# Patient Record
Sex: FEMALE | Race: OTHER | Employment: FULL TIME | URBAN - METROPOLITAN AREA
[De-identification: names, ages, dates, MRNs, and addresses within clinical notes are randomized per-mention and may not be internally consistent; named-entity substitution may affect disease eponyms.]

---

## 2017-05-08 ENCOUNTER — HOSPITAL ENCOUNTER (EMERGENCY)
Facility: HOSPITAL | Age: 22
Discharge: HOME | End: 2017-05-08
Payer: COMMERCIAL

## 2017-05-08 ENCOUNTER — APPOINTMENT (OUTPATIENT)
Dept: GENERAL RADIOLOGY | Facility: HOSPITAL | Age: 22
End: 2017-05-08
Attending: NURSE PRACTITIONER
Payer: COMMERCIAL

## 2017-05-08 ENCOUNTER — HOSPITAL ENCOUNTER (OUTPATIENT)
Age: 22
Discharge: ANOTHER HOSPITAL | End: 2017-05-08
Attending: EMERGENCY MEDICINE
Payer: COMMERCIAL

## 2017-05-08 ENCOUNTER — APPOINTMENT (OUTPATIENT)
Dept: CT IMAGING | Facility: HOSPITAL | Age: 22
End: 2017-05-08
Attending: NURSE PRACTITIONER
Payer: COMMERCIAL

## 2017-05-08 ENCOUNTER — TELEPHONE (OUTPATIENT)
Dept: FAMILY MEDICINE CLINIC | Facility: CLINIC | Age: 22
End: 2017-05-08

## 2017-05-08 VITALS
TEMPERATURE: 99 F | RESPIRATION RATE: 15 BRPM | DIASTOLIC BLOOD PRESSURE: 55 MMHG | SYSTOLIC BLOOD PRESSURE: 108 MMHG | WEIGHT: 120 LBS | HEIGHT: 61 IN | BODY MASS INDEX: 22.66 KG/M2 | HEART RATE: 75 BPM | OXYGEN SATURATION: 99 %

## 2017-05-08 VITALS
TEMPERATURE: 98 F | WEIGHT: 120 LBS | RESPIRATION RATE: 16 BRPM | DIASTOLIC BLOOD PRESSURE: 49 MMHG | BODY MASS INDEX: 22.66 KG/M2 | SYSTOLIC BLOOD PRESSURE: 96 MMHG | HEART RATE: 72 BPM | HEIGHT: 61 IN | OXYGEN SATURATION: 100 %

## 2017-05-08 DIAGNOSIS — I95.9 HYPOTENSION, UNSPECIFIED HYPOTENSION TYPE: ICD-10-CM

## 2017-05-08 DIAGNOSIS — R55 SYNCOPE, NEAR: Primary | ICD-10-CM

## 2017-05-08 PROCEDURE — 81001 URINALYSIS AUTO W/SCOPE: CPT

## 2017-05-08 PROCEDURE — 81025 URINE PREGNANCY TEST: CPT

## 2017-05-08 PROCEDURE — 36415 COLL VENOUS BLD VENIPUNCTURE: CPT

## 2017-05-08 PROCEDURE — 93010 ELECTROCARDIOGRAM REPORT: CPT

## 2017-05-08 PROCEDURE — 93005 ELECTROCARDIOGRAM TRACING: CPT

## 2017-05-08 PROCEDURE — 93010 ELECTROCARDIOGRAM REPORT: CPT | Performed by: INTERNAL MEDICINE

## 2017-05-08 PROCEDURE — 70450 CT HEAD/BRAIN W/O DYE: CPT | Performed by: NURSE PRACTITIONER

## 2017-05-08 PROCEDURE — 71020 XR CHEST PA + LAT CHEST (CPT=71020): CPT | Performed by: NURSE PRACTITIONER

## 2017-05-08 PROCEDURE — 84484 ASSAY OF TROPONIN QUANT: CPT | Performed by: NURSE PRACTITIONER

## 2017-05-08 PROCEDURE — 93010 ELECTROCARDIOGRAM REPORT: CPT | Performed by: EMERGENCY MEDICINE

## 2017-05-08 PROCEDURE — 85025 COMPLETE CBC W/AUTO DIFF WBC: CPT

## 2017-05-08 PROCEDURE — 99285 EMERGENCY DEPT VISIT HI MDM: CPT

## 2017-05-08 PROCEDURE — 99215 OFFICE O/P EST HI 40 MIN: CPT

## 2017-05-08 PROCEDURE — 80048 BASIC METABOLIC PNL TOTAL CA: CPT

## 2017-05-08 NOTE — ED PROVIDER NOTES
Patient Seen in: [facility] [place] [facility] Immediate Care In [address] [place] [address]    History   Patient presents with:  Syncope (cardiovascular, neurologic)    Stated Complaint: syncope 3 times    HPI    The patient is a 25-year-old female with past history of syncope in the in HPI.     Physical Exam       ED Triage Vitals   BP 05/08/17 1139 81/48 mmHg   Pulse 05/08/17 1139 82   Resp 05/08/17 1139 16   Temp 05/08/17 1139 97.9 °F (36.6 °C)   Temp src 05/08/17 1139 Oral   SpO2 05/08/17 1139 100 %   O2 Device 05/08/17 1139 None (R diagnosis)  Hypotension, unspecified hypotension type    Disposition: Ic to ed    Follow-up:  No follow-up provider specified. Medications Prescribed:  There are no discharge medications for this patient.

## 2017-05-08 NOTE — ED INITIAL ASSESSMENT (HPI)
Patient c/o 3 near syncopal episodes this morning. Hx syncope. Sent from [address] [place] [address].

## 2017-05-08 NOTE — ED PROVIDER NOTES
Patient Seen in: [facility] Emergency Department    History   Patient presents with:  Syncope (cardiovascular, neurologic)    Stated Complaint: syncopal episode sent by car from [facility]5 Yadira WANG    20 y/o female presents to the ED From the [facility] other systems reviewed and negative except as noted above. PSFH elements reviewed from today and agreed except as otherwise stated in HPI.     Physical Exam       ED Triage Vitals   BP 05/08/17 1251 108/57 mmHg   Pulse 05/08/17 1251 80   Resp 05/08/17 12 of motion. Neurological: She is alert and oriented to person, place, and time. She has normal strength. No cranial nerve deficit or sensory deficit. GCS eye subscore is 4. GCS verbal subscore is 5. GCS motor subscore is 6. PERRL, accomodation intact.  Rosiland Fallow Miltonewa Collins DO  [address]          [place] [id number] [phone]    Call in 2 days      Colby Storey MD  [address] [facility] [phone]

## 2017-05-08 NOTE — TELEPHONE ENCOUNTER
Definitely should go from the immediate care to the emergency room as they advised and get this worked up.

## 2017-05-08 NOTE — ED INITIAL ASSESSMENT (HPI)
Syncopal episodes x 3 this am    No Loc  Hx of syncope , last episode last October  EKG normal   Did follow up with a CT scan

## 2017-05-08 NOTE — TELEPHONE ENCOUNTER
Pt calling states she suffers form syncope, pt states she had 3 episodes today. Pt would like appt but non available.

## 2017-05-08 NOTE — TELEPHONE ENCOUNTER
Actions Requested: Requesting appt with Víctor, referred to the ED  Situation/Background   Problem: Syncopal episodes x 3 today.    Onset: Today   Associated Symptoms: Denies, slight weakness prior to episodes   History of Same: Yes   Precipitated By: Pt de suddenly after medicine, allergic food or bee sting  * Sounds like a life-threatening emergency to the triager  * Fainted > 15 minutes ago and still looks pale (pale skin, pallor)  * Fainted > 15 minutes ago and still feels weak or dizzy  * History of hear

## 2017-05-09 NOTE — TELEPHONE ENCOUNTER
Pt seen @ [facility] 5/8/17 for syncope and referred to [facility] ED. On 5/8/17 [facility] ED dx pt with syncope and hypotension.  CT Brain, CXR and lab work normal. Discharged home with instructions to make appt with Dr Gilbert/Cardiologist 1-2 days, then Dr Tania Fuentes

## 2017-05-12 ENCOUNTER — HOSPITAL ENCOUNTER (OUTPATIENT)
Dept: CV DIAGNOSTICS | Facility: HOSPITAL | Age: 22
Discharge: HOME | End: 2017-05-12
Attending: NURSE PRACTITIONER
Payer: COMMERCIAL

## 2017-05-12 DIAGNOSIS — R55 SYNCOPE: ICD-10-CM

## 2017-05-12 PROCEDURE — 93227 XTRNL ECG REC<48 HR R&I: CPT | Performed by: NURSE PRACTITIONER

## 2017-05-13 ENCOUNTER — HOSPITAL ENCOUNTER (OUTPATIENT)
Dept: CV DIAGNOSTICS | Facility: HOSPITAL | Age: 22
Discharge: HOME | End: 2017-05-13
Attending: NURSE PRACTITIONER
Payer: COMMERCIAL

## 2017-05-13 PROCEDURE — 93225 XTRNL ECG REC<48 HRS REC: CPT | Performed by: NURSE PRACTITIONER

## 2017-05-15 ENCOUNTER — TELEPHONE (OUTPATIENT)
Dept: FAMILY MEDICINE CLINIC | Facility: CLINIC | Age: 22
End: 2017-05-15

## 2017-05-15 DIAGNOSIS — R55 SYNCOPE, UNSPECIFIED SYNCOPE TYPE: Primary | ICD-10-CM

## 2017-05-15 NOTE — TELEPHONE ENCOUNTER
Pt questioning if should keep appt tomorrow for ER f/u with Dr Ana Suazo or wait until after she is seen by cardiology?   Said  Holter Monitor results not back yet, once back will schedule appt  with Cardiology

## 2017-05-16 NOTE — TELEPHONE ENCOUNTER
Reason for call changed from physician to call to acute  Oneal Ruvalcaba please tranfer call to [facility] today or  triage RN [phone]

## 2017-05-17 NOTE — TELEPHONE ENCOUNTER
Please see ER discharge notes below, she is to see cardio and neuro as well, pt states she called Dr. Fadia Curtis office and was told she will need to see Dr. Dani Vance, Electrophysiology after holter results are back.   The results are back and pasted below, I hav

## 2017-05-17 NOTE — TELEPHONE ENCOUNTER
Mirela Lima DO   [address] [address] [phone]         Mone Haddad Call in 2 days  Praveen Anna [address] [phone]        Tran Lorenzo MD Call in 1 day Referral Cardiologist [address] [facility]

## 2017-05-20 NOTE — TELEPHONE ENCOUNTER
If you have not seen a neurologist, I would at least see them because in October of last year you complained of syncope 3 times and stated that was going on for about 2 years.

## 2017-05-20 NOTE — TELEPHONE ENCOUNTER
Pt stated that she does not need anything else and question if she really needs to see the these specialist as she is feeling well and stated that she only had the syncope episode that one day and in the ER they did a lot of test and everting came back nor

## 2017-06-20 PROCEDURE — 82607 VITAMIN B-12: CPT | Performed by: OTHER

## 2017-06-20 PROCEDURE — 82746 ASSAY OF FOLIC ACID SERUM: CPT | Performed by: OTHER

## 2017-08-01 ENCOUNTER — TELEPHONE (OUTPATIENT)
Dept: FAMILY MEDICINE CLINIC | Facility: CLINIC | Age: 22
End: 2017-08-01

## 2017-08-01 DIAGNOSIS — Z12.4 CERVICAL CANCER SCREENING: Primary | ICD-10-CM

## 2017-08-01 NOTE — TELEPHONE ENCOUNTER
Pt called in requesting a referral to see OBGYN for an annual px (pap). Pt requesting a call back with referral info once completed please.

## 2019-06-03 ENCOUNTER — TELEPHONE (OUTPATIENT)
Dept: CASE MANAGEMENT | Age: 24
End: 2019-06-03

## 2020-01-28 ENCOUNTER — TELEPHONE (OUTPATIENT)
Dept: FAMILY MEDICINE CLINIC | Facility: CLINIC | Age: 25
End: 2020-01-28

## 2020-01-28 NOTE — TELEPHONE ENCOUNTER
Spoke with dad Marilu Kumar (VERÓNICA and  verified)--reports his other daughter is about to give birth--patient is living with her right now.      Dad asking if patient is covered from previous TDAP (records show last TDAP 2008)--dad asking if patient needs felix

## 2020-01-29 NOTE — TELEPHONE ENCOUNTER
Last one we have is from 2008. We have not seen her in 4 years. She can of course make an appointment to see me if she wishes or get the Tdap injection elsewhere.

## 2020-01-30 NOTE — TELEPHONE ENCOUNTER
Spoke with patient father Rebecca Palmer  (  verified ) informed of   Dr. Suly Nicholas below.     Patient is currently living in [place], she will get the Tdap injection there

## (undated) NOTE — ED AVS SNAPSHOT
[facility] Emergency Department    [place] [address]    Phone:  [phone]    Fax:  [phone]           Kolby Kate   MRN: [id number]    Department:  [facility] Emergency Department   Date of Visit:  5/8 and Class Registration line at [phone] or find a doctor online by visiting [email or web address].    IF THERE IS ANY CHANGE OR WORSENING OF YOUR CONDITION, CALL YOUR PRIMARY CARE PHYSICIAN AT ONCE OR RETURN IMMEDIATELY TO [address] [place] [address].     If

## (undated) NOTE — LETTER
6/3/2019        [place] [id number]         Dear Pj Couch,    This letter is to inform you that our office has made several attempts to reach you by phone without success.   We were attempting to contact you by phone re

## (undated) NOTE — LETTER
May 8, 2017    Patient: Di Perrin   Date of Visit: 5/8/2017       To Whom It May Concern:    Catrachita Mcdonough was seen and treated in our emergency department on 5/8/2017.  She may return to work 5/9/2017    If you have any questions or concerns, p

## (undated) NOTE — ED AVS SNAPSHOT
[facility] Emergency Department    [address]     [address] [place] [address]    Phone:  [phone]    Fax:  [phone]           Josué Choi   MRN: [id number]    Department:  [facility] Emergency Department   Date of Visit:  5/8 Insurance plans vary and the physician(s) referred by the ER may not be covered by your plan. Please contact your insurance company to determine coverage and benefits available for follow-up care and referrals.       If you have difficulty scheduling your prescription right away and begin taking the medication(s) as directed.   If you believe that any of the medications or instructions on this list is different from what your Primary Care doctor has instructed you - please continue to take your medications a Patient [address] to help you get signed up for insurance coverage. Patient [address] is a Federal Navigator program that can help with your Affordable Care Act coverage, as well as all types of Medicaid plans.   To get signed up and covere